# Patient Record
Sex: MALE | Race: WHITE | NOT HISPANIC OR LATINO | Employment: FULL TIME | URBAN - METROPOLITAN AREA
[De-identification: names, ages, dates, MRNs, and addresses within clinical notes are randomized per-mention and may not be internally consistent; named-entity substitution may affect disease eponyms.]

---

## 2023-12-16 ENCOUNTER — OFFICE VISIT (OUTPATIENT)
Dept: URGENT CARE | Facility: CLINIC | Age: 43
End: 2023-12-16
Payer: MEDICAID

## 2023-12-16 VITALS
DIASTOLIC BLOOD PRESSURE: 95 MMHG | TEMPERATURE: 97.9 F | SYSTOLIC BLOOD PRESSURE: 149 MMHG | WEIGHT: 198 LBS | OXYGEN SATURATION: 97 % | RESPIRATION RATE: 18 BRPM | BODY MASS INDEX: 26.82 KG/M2 | HEART RATE: 102 BPM | HEIGHT: 72 IN

## 2023-12-16 DIAGNOSIS — H60.501 ACUTE OTITIS EXTERNA OF RIGHT EAR, UNSPECIFIED TYPE: Primary | ICD-10-CM

## 2023-12-16 PROCEDURE — 99212 OFFICE O/P EST SF 10 MIN: CPT

## 2023-12-16 RX ORDER — CIPROFLOXACIN AND DEXAMETHASONE 3; 1 MG/ML; MG/ML
4 SUSPENSION/ DROPS AURICULAR (OTIC) 2 TIMES DAILY
Qty: 7.5 ML | Refills: 0 | Status: SHIPPED | OUTPATIENT
Start: 2023-12-16 | End: 2023-12-23

## 2023-12-16 NOTE — PROGRESS NOTES
[facility]IN[facility] NOTE:      Name: Jc Thayer, 43 y.o.    CSN:[id number]   MRN:[id number]    PCP: No primary care provider on file.    ALL:  No Known Allergies    History:    Chief Complaint: Earache (Ear pain on the right side)    Encounter Date: 12/16/2023      HPI: The history was obtained from the patient. Jc is a 43 y.o. male, who presents with a chief complaint of Earache (Ear pain on the right side) x 2 days. He states it feels like the ear is clogged and this has happened to him before. It is painful to press on the tragus of the ear and the ear canal is painful itself. He denies fevers, congestion, cough, N/V, changes in vision, hearing.     PMHx:    No past medical history on file.           Current Outpatient Medications   Medication Sig Dispense Refill    ciprofloxacin-dexamethasone (Ciprodex) otic suspension Administer 4 drops into the right ear 2 times a day for 7 days. 7.5 mL 0     No current facility-administered medications for this visit.         PMSx:  No past surgical history on file.    Fam Hx: No family history on file.    SOC. Hx:     Social History     Socioeconomic History    Marital status: Single     Spouse name: Not on file    Number of children: Not on file    Years of education: Not on file    Highest education level: Not on file   Occupational History    Not on file   Tobacco Use    Smoking status: Not on file    Smokeless tobacco: Not on file   Substance and Sexual Activity    Alcohol use: Not on file    Drug use: Not on file    Sexual activity: Not on file   Other Topics Concern    Not on file   Social History Narrative    Not on file     Social Determinants of Health     Financial Resource Strain: Not on file   Food Insecurity: Not on file   Transportation Needs: Not on file   Physical Activity: Not on file   Stress: Not on file   Social Connections: Not on file   Intimate Partner Violence: Not on file   Housing Stability: Not on file          Vitals:    12/16/23 1216   BP: (!) 149/95   Pulse: 102   Resp: 18   Temp: 36.6 °C (97.9 °F)   SpO2: 97%     89.8 kg (198 lb)          Physical Exam  Constitutional:       Appearance: Normal appearance.   HENT:      Right Ear: Tympanic membrane normal.      Left Ear: Tympanic membrane normal.      Ears:      Comments: Erythematous R. Ear canal noted. Tenderness to palpation of the tragus of R ear. No tenderness to pre/post auricular regions noted.      Nose: Nose normal.      Mouth/Throat:      Mouth: Mucous membranes are moist.      Pharynx: Oropharynx is clear.   Eyes:      Conjunctiva/sclera: Conjunctivae normal.      Pupils: Pupils are equal, round, and reactive to light.   Cardiovascular:      Rate and Rhythm: Normal rate and regular rhythm.   Pulmonary:      Effort: Pulmonary effort is normal.      Breath sounds: Normal breath sounds.   Skin:     General: Skin is warm.   Neurological:      General: No focal deficit present.      Mental Status: He is alert and oriented to person, place, and time.   Psychiatric:         Mood and Affect: Mood normal.         Behavior: Behavior normal.         LABORATORY @ RADIOLOGICAL IMAGING (if done):     No results found for this or any previous visit (from the past 24 hour(s)).    UC COURSE/MEDICAL DECISION MAKING:    Jc is a 43 y.o., who presents with a working diagnosis of   1. Acute otitis externa of right ear, unspecified type      Jc was seen today for earache.  Diagnoses and all orders for this visit:  Acute otitis externa of right ear, unspecified type (Primary)  -     ciprofloxacin-dexamethasone (Ciprodex) otic suspension; Administer 4 drops into the right ear 2 times a day for 7 days.    Based on my clinical evaluation Jc has acute otitis externa of the R ear. At this time there is no evidence of tympanic membrane perforation, OM, or bacterial sinus infection. In my medical opinion, I consider Jc to be low risk for any serious/life-threatening  entity, and deem him stable for discharge.     I instructed him to use the antibiotic drops as directed. As we discussed he is to return to our office or ER immediately if there is any worsening of his symptoms, such as fever, nausea/vomiting, jaw pain or if his condition worsens at all.        Bhargavi Samson PA-C   Advanced Practice Provider  [facility]